# Patient Record
Sex: FEMALE | Race: WHITE | NOT HISPANIC OR LATINO | Employment: OTHER | URBAN - METROPOLITAN AREA
[De-identification: names, ages, dates, MRNs, and addresses within clinical notes are randomized per-mention and may not be internally consistent; named-entity substitution may affect disease eponyms.]

---

## 2017-01-04 ENCOUNTER — OFFICE VISIT (OUTPATIENT)
Dept: FAMILY MEDICINE CLINIC | Facility: CLINIC | Age: 72
End: 2017-01-04

## 2017-01-04 VITALS
SYSTOLIC BLOOD PRESSURE: 120 MMHG | BODY MASS INDEX: 25.88 KG/M2 | HEART RATE: 93 BPM | TEMPERATURE: 97.9 F | HEIGHT: 66 IN | WEIGHT: 161 LBS | DIASTOLIC BLOOD PRESSURE: 84 MMHG

## 2017-01-04 DIAGNOSIS — I10 ESSENTIAL HYPERTENSION: Primary | ICD-10-CM

## 2017-01-04 DIAGNOSIS — C57.00 FALLOPIAN TUBE CANCER, CARCINOMA, UNSPECIFIED LATERALITY (HCC): ICD-10-CM

## 2017-01-04 PROCEDURE — 99213 OFFICE O/P EST LOW 20 MIN: CPT | Performed by: FAMILY MEDICINE

## 2017-01-04 RX ORDER — DICYCLOMINE HCL 20 MG
20 TABLET ORAL
COMMUNITY
Start: 2016-12-21 | End: 2017-01-20

## 2017-01-04 RX ORDER — HYDROCODONE BITARTRATE AND ACETAMINOPHEN 5; 325 MG/1; MG/1
1-2 TABLET ORAL
COMMUNITY
Start: 2016-12-08 | End: 2017-10-03

## 2017-01-04 RX ORDER — ONDANSETRON 4 MG/1
4 TABLET, FILM COATED ORAL EVERY 8 HOURS
COMMUNITY
Start: 2016-12-08 | End: 2017-07-21

## 2017-01-04 RX ORDER — DOCOSANOL 100 MG/G
1 CREAM TOPICAL
COMMUNITY
Start: 2016-12-21 | End: 2017-07-21

## 2017-01-04 RX ORDER — SUCRALFATE 1 G/1
1 TABLET ORAL
COMMUNITY
Start: 2016-12-21 | End: 2017-07-21

## 2017-01-04 RX ORDER — OMEPRAZOLE 40 MG/1
40 CAPSULE, DELAYED RELEASE ORAL
COMMUNITY
Start: 2016-12-23 | End: 2017-07-21

## 2017-01-04 RX ORDER — OLANZAPINE 2.5 MG/1
5 TABLET ORAL
COMMUNITY
Start: 2016-12-08 | End: 2017-07-21

## 2017-01-04 NOTE — MR AVS SNAPSHOT
Lorraine GARY Jarocho   1/4/2017 10:50 AM   Office Visit    Dept Phone:  [phone]   Encounter #:  [id number]    Provider:  Pedro Pablo Aldridge Jr., MD   Department:  [facility]                Your Full Care Plan              Your Updated Medication List          This list is accurate as of: 1/4/17 12:17 PM.  Always use your most recent med list.                apixaban 5 MG tablet tablet   Commonly known as:  ELIQUIS       clidinium-chlordiazePOXIDE 5-2.5 MG per capsule   Commonly known as:  LIBRAX   Take 1 capsule by mouth 4 (four) times a day before meals and nightly for indigestion.       * dicyclomine 10 MG capsule   Commonly known as:  BENTYL   Take 1 capsule by mouth 4 (Four) Times a Day Before Meals & at Bedtime.       * dicyclomine 20 MG tablet   Commonly known as:  BENTYL       docosanol 10 % cream cream   Commonly known as:  ABREVA       FLUoxetine 20 MG capsule   Commonly known as:  PROzac   Take 1 capsule by mouth Daily.       HYDROcodone-acetaminophen 5-325 MG per tablet   Commonly known as:  NORCO       lisinopril 10 MG tablet   Commonly known as:  PRINIVIL,ZESTRIL   Take 1 tablet by mouth Daily.       LORazepam 0.5 MG tablet   Commonly known as:  ATIVAN   Take 1 tablet by mouth 2 (Two) Times a Day As Needed for anxiety.       OLANZapine 2.5 MG tablet   Commonly known as:  zyPREXA       omeprazole 40 MG capsule   Commonly known as:  priLOSEC       ondansetron 4 MG tablet   Commonly known as:  ZOFRAN       pantoprazole 40 MG EC tablet   Commonly known as:  PROTONIX   Take 1 tablet by mouth Daily.       sucralfate 1 G tablet   Commonly known as:  CARAFATE       triamterene-hydrochlorothiazide 37.5-25 MG per capsule   Commonly known as:  DYAZIDE   Take 1 capsule by mouth Daily.       * Notice:  This list has 2 medication(s) that are the same as other medications prescribed for you. Read the directions carefully, and ask your doctor or other  care provider to review them with you.            You Were Diagnosed With        Codes Comments    Essential hypertension    -  Primary ICD-10-CM: I10  ICD-9-CM: 401.9     Fallopian tube cancer, carcinoma, unspecified laterality     ICD-10-CM: C57.00  ICD-9-CM: 183.2       Instructions     None    Patient Instructions History      Upcoming Appointments     Visit Type Date Time Department    HOSPITAL FOLLOW UP 2017 10:50 AM [facility]    FOLLOW UP 2017  1:30 PM [facility]      [employer] Signup     [facility] [employer] allows you to send messages to your doctor, view your test results, renew your prescriptions, schedule appointments, and more. To sign up, go to HeTexted and click on the Sign Up Now link in the New User? box. Enter your [employer] Activation Code exactly as it appears below along with the last four digits of your Social Security Number and your Date of Birth () to complete the sign-up process. If you do not sign up before the expiration date, you must request a new code.    [employer] Activation Code: [email or web address]  Expires: 2017 12:17 PM    If you have questions, you can email [email or web address] or call [phone] to talk to our [employer] staff. Remember, [employer] is NOT to be used for urgent needs. For medical emergencies, dial 911.               Other Info from Your Visit           Your Appointments     2017  1:30 PM EST   Follow Up with Pedro Pablo Aldridge Jr., MD   [facility] (--)    [address] [place] [id number] [phone]           Arrive 15 minutes prior to appointment.              Allergies     Penicillins Intolerance Hives      Reason for Visit     Surgery follow up to remove fallopian tube cancer    Hypertension follow up, blood pressure had be running low      Vital Signs     Blood Pressure Pulse Temperature Height Weight Body Mass Index    120/84 (BP Location: Left  "arm, Patient Position: Sitting, Cuff Size: Adult) 93 97.9 °F (36.6 °C) (Oral) 66\" (167.6 cm) 161 lb (73 kg) 25.99 kg/m2    Smoking Status                   Former Smoker           Problems and Diagnoses Noted     High blood pressure    -  Primary    Cancer of fallopian tube            "

## 2017-01-04 NOTE — PROGRESS NOTES
Chief Complaint   Patient presents with   • Surgery follow up     to remove fallopian tube cancer   • Hypertension     follow up, blood pressure had be running low       Subjective.../HPI  Patient present today with htn. Has adenocarcinoma of the fallopian tubes.    I have reviewed the patient's medical history in detail and updated the computerized patient record.    Family History   Problem Relation Age of Onset   • Colon cancer Sister        Social History     Social History   • Marital status:      Spouse name: N/A   • Number of children: N/A   • Years of education: N/A     Occupational History   • Not on file.     Social History Main Topics   • Smoking status: Former Smoker   • Smokeless tobacco: Never Used   • Alcohol use No   • Drug use: No   • Sexual activity: Not on file     Other Topics Concern   • Not on file     Social History Narrative       Review of Systems:   Review of Systems   Constitutional: Negative for chills, fatigue, fever and unexpected weight change.   HENT: Negative for ear pain, hearing loss, sinus pressure, sore throat and tinnitus.    Eyes: Negative for pain, discharge and redness.   Respiratory: Negative for cough, shortness of breath and wheezing.    Cardiovascular: Negative for chest pain, palpitations and leg swelling.   Gastrointestinal: Negative for abdominal pain, constipation, diarrhea and nausea.   Endocrine: Negative for cold intolerance and heat intolerance.   Genitourinary: Negative for difficulty urinating, flank pain and urgency.   Musculoskeletal: Negative for back pain, joint swelling and myalgias.   Skin: Negative for rash and wound.   Allergic/Immunologic: Negative for environmental allergies and food allergies.   Neurological: Negative for dizziness, seizures, numbness and headaches.   Hematological: Negative for adenopathy. Does not bruise/bleed easily.   Psychiatric/Behavioral: Negative for decreased concentration, dysphoric mood and sleep disturbance. The  "patient is not nervous/anxious.    All other systems reviewed and are negative.      Objective:  Vital Signs     Vitals:    01/04/17 1052 01/04/17 1145   BP: 131/86 120/84   BP Location: Right arm Left arm   Patient Position: Sitting Sitting   Cuff Size:  Adult   Pulse: 93    Temp: 97.9 °F (36.6 °C)    TempSrc: Oral    Weight: 161 lb (73 kg)    Height: 66\" (167.6 cm)      Physical Exam   Constitutional: She is oriented to person, place, and time. She appears well-developed and well-nourished.   HENT:   Head: Normocephalic.   Eyes: Pupils are equal, round, and reactive to light.   Neck: Normal range of motion.   Cardiovascular: Normal rate and regular rhythm.    Pulmonary/Chest: Effort normal.   Abdominal: Soft. Bowel sounds are normal. She exhibits no distension and no mass. There is no tenderness. There is no rebound and no guarding.   Port on RLQ and slight red   Musculoskeletal: Normal range of motion.   Neurological: She is alert and oriented to person, place, and time.   Skin: Skin is warm and dry.        Results Review:      REVIEWED AND DISCUSSED CLINICAL RESULTS WITH PATIENT                          Current Outpatient Prescriptions:   •  apixaban (ELIQUIS) 5 MG tablet tablet, Take 5 mg by mouth., Disp: , Rfl:   •  clidinium-chlordiazepoxide (LIBRAX) 5-2.5 MG per capsule, Take 1 capsule by mouth 4 (four) times a day before meals and nightly for indigestion., Disp: 120 capsule, Rfl: 2  •  dicyclomine (BENTYL) 10 MG capsule, Take 1 capsule by mouth 4 (Four) Times a Day Before Meals & at Bedtime., Disp: 60 capsule, Rfl: 5  •  dicyclomine (BENTYL) 20 MG tablet, Take 20 mg by mouth., Disp: , Rfl:   •  docosanol (ABREVA) 10 % cream cream, Apply 1 application topically., Disp: , Rfl:   •  FLUoxetine (PROzac) 20 MG capsule, Take 1 capsule by mouth Daily., Disp: 30 capsule, Rfl: 2  •  HYDROcodone-acetaminophen (NORCO) 5-325 MG per tablet, Take 1-2 tablets by mouth., Disp: , Rfl:   •  lisinopril (PRINIVIL,ZESTRIL) 10 " MG tablet, Take 1 tablet by mouth Daily., Disp: 30 tablet, Rfl: 2  •  LORazepam (ATIVAN) 0.5 MG tablet, Take 1 tablet by mouth 2 (Two) Times a Day As Needed for anxiety., Disp: 30 tablet, Rfl: 2  •  OLANZapine (zyPREXA) 2.5 MG tablet, Take 2.5 mg by mouth., Disp: , Rfl:   •  omeprazole (priLOSEC) 40 MG capsule, Take 40 mg by mouth., Disp: , Rfl:   •  ondansetron (ZOFRAN) 4 MG tablet, Take 4 mg by mouth Every 8 (Eight) Hours., Disp: , Rfl:   •  pantoprazole (PROTONIX) 40 MG EC tablet, Take 1 tablet by mouth Daily., Disp: 30 tablet, Rfl: 2  •  sucralfate (CARAFATE) 1 G tablet, Take 1 g by mouth., Disp: , Rfl:   •  triamterene-hydrochlorothiazide (DYAZIDE) 37.5-25 MG per capsule, Take 1 capsule by mouth Daily., Disp: 30 capsule, Rfl: 2    Procedures    Assessment/Plan     Diagnoses and all orders for this visit:    Essential hypertension    Fallopian tube cancer, carcinoma, unspecified laterality    Other orders  -     omeprazole (priLOSEC) 40 MG capsule; Take 40 mg by mouth.  -     sucralfate (CARAFATE) 1 G tablet; Take 1 g by mouth.  -     apixaban (ELIQUIS) 5 MG tablet tablet; Take 5 mg by mouth.  -     ondansetron (ZOFRAN) 4 MG tablet; Take 4 mg by mouth Every 8 (Eight) Hours.  -     OLANZapine (zyPREXA) 2.5 MG tablet; Take 2.5 mg by mouth.  -     dicyclomine (BENTYL) 20 MG tablet; Take 20 mg by mouth.  -     HYDROcodone-acetaminophen (NORCO) 5-325 MG per tablet; Take 1-2 tablets by mouth.  -     docosanol (ABREVA) 10 % cream cream; Apply 1 application topically.           Pedro Pablo Aldridge Jr., MD  01/04/17  12:09 PM

## 2017-01-23 ENCOUNTER — OFFICE VISIT (OUTPATIENT)
Dept: FAMILY MEDICINE CLINIC | Facility: CLINIC | Age: 72
End: 2017-01-23

## 2017-01-23 VITALS
HEART RATE: 100 BPM | HEIGHT: 66 IN | OXYGEN SATURATION: 100 % | DIASTOLIC BLOOD PRESSURE: 70 MMHG | WEIGHT: 154.8 LBS | TEMPERATURE: 99.1 F | BODY MASS INDEX: 24.88 KG/M2 | SYSTOLIC BLOOD PRESSURE: 122 MMHG

## 2017-01-23 DIAGNOSIS — K59.03 DRUG-INDUCED CONSTIPATION: Primary | ICD-10-CM

## 2017-01-23 PROCEDURE — 99213 OFFICE O/P EST LOW 20 MIN: CPT | Performed by: FAMILY MEDICINE

## 2017-01-23 RX ORDER — BISACODYL 10 MG
10 SUPPOSITORY, RECTAL RECTAL DAILY
Qty: 10 SUPPOSITORY | Refills: 5 | Status: SHIPPED | OUTPATIENT
Start: 2017-01-23 | End: 2017-07-21

## 2017-01-23 RX ORDER — POLYETHYLENE GLYCOL 3350 17 G/17G
17 POWDER, FOR SOLUTION ORAL DAILY
Qty: 30 EACH | Refills: 5 | Status: SHIPPED | OUTPATIENT
Start: 2017-01-23

## 2017-01-23 RX ORDER — DOCUSATE SODIUM 100 MG/1
100 CAPSULE, LIQUID FILLED ORAL 2 TIMES DAILY
Qty: 60 CAPSULE | Refills: 5 | Status: SHIPPED | OUTPATIENT
Start: 2017-01-23 | End: 2017-10-03 | Stop reason: SDDI

## 2017-01-23 NOTE — MR AVS SNAPSHOT
Lorraine GARY Jarocho   1/23/2017 1:30 PM   Office Visit    Dept Phone:  [phone]   Encounter #:  [id number]    Provider:  Pedro Pablo Aldridge Jr., MD   Department:  [facility] AND INTERNAL MEDICINE                Your Full Care Plan              Today's Medication Changes          These changes are accurate as of: 1/23/17  2:56 PM.  If you have any questions, ask your nurse or doctor.               New Medication(s)Ordered:     bisacodyl 10 MG suppository   Commonly known as:  DULCOLAX   Insert 1 suppository into the rectum Daily.       docusate sodium 100 MG capsule   Commonly known as:  COLACE   Take 1 capsule by mouth 2 (Two) Times a Day.       Linaclotide 145 MCG capsule   Take 145 mcg by mouth Daily As Needed (constipation).       polyethylene glycol packet   Commonly known as:  MIRALAX   Take 17 g by mouth Daily.         Stop taking medication(s)listed here:     clidinium-chlordiazePOXIDE 5-2.5 MG per capsule   Commonly known as:  LIBRAX           dicyclomine 10 MG capsule   Commonly known as:  BENTYL           FLUoxetine 20 MG capsule   Commonly known as:  PROzac           lisinopril 10 MG tablet   Commonly known as:  PRINIVIL,ZESTRIL           pantoprazole 40 MG EC tablet   Commonly known as:  PROTONIX           triamterene-hydrochlorothiazide 37.5-25 MG per capsule   Commonly known as:  DYAZIDE                Where to Get Your Medications      You can get these medications from any pharmacy     Bring a paper prescription for each of these medications     bisacodyl 10 MG suppository    docusate sodium 100 MG capsule    polyethylene glycol packet         Information about where to get these medications is not yet available     ! Ask your nurse or doctor about these medications     Linaclotide 145 MCG capsule                  Your Updated Medication List          This list is accurate as of: 1/23/17  2:56 PM.  Always use your most recent med list.                apixaban 5 MG tablet tablet   Commonly known as:  ELIQUIS       bisacodyl 10 MG suppository   Commonly known as:  DULCOLAX   Insert 1 suppository into the rectum Daily.       docosanol 10 % cream cream   Commonly known as:  ABREVA       docusate sodium 100 MG capsule   Commonly known as:  COLACE   Take 1 capsule by mouth 2 (Two) Times a Day.       HYDROcodone-acetaminophen 5-325 MG per tablet   Commonly known as:  NORCO       Linaclotide 145 MCG capsule   Take 145 mcg by mouth Daily As Needed (constipation).       LORazepam 0.5 MG tablet   Commonly known as:  ATIVAN   Take 1 tablet by mouth 2 (Two) Times a Day As Needed for anxiety.       OLANZapine 2.5 MG tablet   Commonly known as:  zyPREXA       omeprazole 40 MG capsule   Commonly known as:  priLOSEC       ondansetron 4 MG tablet   Commonly known as:  ZOFRAN       polyethylene glycol packet   Commonly known as:  MIRALAX   Take 17 g by mouth Daily.       sucralfate 1 G tablet   Commonly known as:  CARAFATE               You Were Diagnosed With        Codes Comments    Drug-induced constipation    -  Primary ICD-10-CM: K59.03  ICD-9-CM: 564.09, E980.5       Instructions     None    Patient Instructions History      Upcoming Appointments     Visit Type Date Time Department    ACUTE           2017  1:30 PM [facility] [place] [facility]    [facility]      [employer] Signup     [facility] [employer] allows you to send messages to your doctor, view your test results, renew your prescriptions, schedule appointments, and more. To sign up, go to Cyan and click on the Sign Up Now link in the New User? box. Enter your [employer] Activation Code exactly as it appears below along with the last four digits of your Social Security Number and your Date of Birth () to complete the sign-up process. If you do not sign up before the expiration date, you must request a new code.    [employer] Activation Code: [email or web address]  Expires:  "2/6/2017  2:56 PM    If you have questions, you can email [email or web address] or call [phone] to talk to our MyChart staff. Remember, [employer] is NOT to be used for urgent needs. For medical emergencies, dial 911.               Other Info from Your Visit           Your Appointments     Feb 16, 2017  1:30 PM EST   Follow Up with Pedro Pablo Aldridge Jr., MD   [facility] INTERNAL MEDICINE (--)    [address] [place] [address] [phone]           Arrive 15 minutes prior to appointment.              Allergies     Penicillins Intolerance Hives      Reason for Visit     Nausea c/o nausea and constipation      Vital Signs     Blood Pressure Pulse Temperature Height    122/70 (BP Location: Right arm, Patient Position: Sitting, Cuff Size: Adult) 100 99.1 °F (37.3 °C) (Oral) 66\" (167.6 cm)    Weight Oxygen Saturation Body Mass Index Smoking Status    154 lb 12.8 oz (70.2 kg) 100% 24.99 kg/m2 Former Smoker      Problems and Diagnoses Noted     Drug-induced constipation    -  Primary        "

## 2017-01-23 NOTE — PROGRESS NOTES
"  Chief Complaint   Patient presents with   • Nausea     c/o nausea and constipation       Subjective.../HPI  Patient present today with nausea. Has not had pain meds in one week. She is moving to [place] in one week to live with daughter until may 2017 when finish chemo therapy. Will be seeing dr wong for chemo in [place]. Was in hospital for dehydration.     I have reviewed the patient's medical history in detail and updated the computerized patient record.    Family History   Problem Relation Age of Onset   • Colon cancer Sister        Social History     Social History   • Marital status:      Spouse name: N/A   • Number of children: N/A   • Years of education: N/A     Occupational History   • Not on file.     Social History Main Topics   • Smoking status: Former Smoker   • Smokeless tobacco: Never Used   • Alcohol use No   • Drug use: No   • Sexual activity: Not on file     Other Topics Concern   • Not on file     Social History Narrative       Review of Systems:   Review of Systems   Constitutional: Negative.    HENT: Negative.    Eyes: Negative.    Respiratory: Negative.    Cardiovascular: Negative.    Gastrointestinal: Positive for constipation and nausea.   Endocrine: Negative.    Genitourinary: Negative.    Musculoskeletal: Negative.    Skin: Negative.    Allergic/Immunologic: Negative.    Neurological: Negative.    Hematological: Negative.    Psychiatric/Behavioral: Negative.        Objective:  Vital Signs     Vitals:    01/23/17 1357   BP: 122/70   BP Location: Right arm   Patient Position: Sitting   Cuff Size: Adult   Pulse: 100   Temp: 99.1 °F (37.3 °C)   TempSrc: Oral   SpO2: 100%   Weight: 154 lb 12.8 oz (70.2 kg)   Height: 66\" (167.6 cm)     Physical Exam   Constitutional: She is oriented to person, place, and time. She appears well-developed and well-nourished.   HENT:   Head: Normocephalic.   Eyes: Pupils are equal, round, and reactive to light.   Neck: Normal range of motion. "   Cardiovascular: Normal rate and regular rhythm.    Pulmonary/Chest: Effort normal.   Abdominal: Soft. Bowel sounds are normal. She exhibits no mass. There is tenderness (tender all over). There is no rebound and no guarding.   Musculoskeletal: Normal range of motion.   Neurological: She is alert and oriented to person, place, and time.   Skin: Skin is warm and dry.        Results Review:      REVIEWED AND DISCUSSED CLINICAL RESULTS WITH PATIENT                          Current Outpatient Prescriptions:   •  apixaban (ELIQUIS) 5 MG tablet tablet, Take 5 mg by mouth., Disp: , Rfl:   •  HYDROcodone-acetaminophen (NORCO) 5-325 MG per tablet, Take 1-2 tablets by mouth., Disp: , Rfl:   •  OLANZapine (zyPREXA) 2.5 MG tablet, Take 5 mg by mouth., Disp: , Rfl:   •  omeprazole (priLOSEC) 40 MG capsule, Take 40 mg by mouth., Disp: , Rfl:   •  ondansetron (ZOFRAN) 4 MG tablet, Take 4 mg by mouth Every 8 (Eight) Hours., Disp: , Rfl:   •  bisacodyl (DULCOLAX) 10 MG suppository, Insert 1 suppository into the rectum Daily., Disp: 10 suppository, Rfl: 5  •  docosanol (ABREVA) 10 % cream cream, Apply 1 application topically., Disp: , Rfl:   •  docusate sodium (COLACE) 100 MG capsule, Take 1 capsule by mouth 2 (Two) Times a Day., Disp: 60 capsule, Rfl: 5  •  Linaclotide 145 MCG capsule, Take 145 mcg by mouth Daily As Needed (constipation)., Disp: 30 capsule, Rfl: 0  •  LORazepam (ATIVAN) 0.5 MG tablet, Take 1 tablet by mouth 2 (Two) Times a Day As Needed for anxiety., Disp: 30 tablet, Rfl: 2  •  polyethylene glycol (MIRALAX) packet, Take 17 g by mouth Daily., Disp: 30 each, Rfl: 5  •  sucralfate (CARAFATE) 1 G tablet, Take 1 g by mouth., Disp: , Rfl:     Procedures    Assessment/Plan     Diagnoses and all orders for this visit:    Drug-induced constipation    Other orders  -     polyethylene glycol (MIRALAX) packet; Take 17 g by mouth Daily.  -     docusate sodium (COLACE) 100 MG capsule; Take 1 capsule by mouth 2 (Two) Times a  Day.  -     bisacodyl (DULCOLAX) 10 MG suppository; Insert 1 suppository into the rectum Daily.  -     Linaclotide 145 MCG capsule; Take 145 mcg by mouth Daily As Needed (constipation).         Pedro Pablo Aldridge Jr., MD  01/23/17  2:50 PM

## 2017-05-05 ENCOUNTER — TELEPHONE (OUTPATIENT)
Dept: FAMILY MEDICINE CLINIC | Facility: CLINIC | Age: 72
End: 2017-05-05

## 2017-06-06 ENCOUNTER — OFFICE VISIT (OUTPATIENT)
Dept: FAMILY MEDICINE CLINIC | Facility: CLINIC | Age: 72
End: 2017-06-06

## 2017-06-06 VITALS
OXYGEN SATURATION: 97 % | DIASTOLIC BLOOD PRESSURE: 92 MMHG | WEIGHT: 145 LBS | SYSTOLIC BLOOD PRESSURE: 138 MMHG | TEMPERATURE: 97.8 F | HEIGHT: 65 IN | BODY MASS INDEX: 24.16 KG/M2 | HEART RATE: 85 BPM

## 2017-06-06 DIAGNOSIS — M89.8X9 BONE PAIN: ICD-10-CM

## 2017-06-06 DIAGNOSIS — C57.00 FALLOPIAN TUBE CANCER, CARCINOMA, UNSPECIFIED LATERALITY (HCC): Primary | ICD-10-CM

## 2017-06-06 PROCEDURE — G0439 PPPS, SUBSEQ VISIT: HCPCS | Performed by: FAMILY MEDICINE

## 2017-06-06 PROCEDURE — 99213 OFFICE O/P EST LOW 20 MIN: CPT | Performed by: FAMILY MEDICINE

## 2017-06-06 NOTE — PROGRESS NOTES
QUICK REFERENCE INFORMATION:  The ABCs of the Annual Wellness Visit    Subsequent Medicare Wellness Visit    HEALTH RISK ASSESSMENT    1945    Recent Hospitalizations:  Recently treated at the following:  [facility].        Current Medical Providers:  Patient Care Team:  Pedro Pablo Aldridge Jr., MD as PCP - General  Pedro Pablo Aldridge Jr., MD as PCP - Family Medicine  Pedro Pablo Aldridge Jr., MD as PCP - Claims Attributed        Smoking Status:  History   Smoking Status   • Former Smoker   Smokeless Tobacco   • Never Used       Alcohol Consumption:  History   Alcohol Use No       Depression Screen:   No flowsheet data found.    Health Habits and Functional and Cognitive Screening:  Functional & Cognitive Status 6/6/2017   Do you have difficulty preparing food and eating? No   Do you have difficulty bathing yourself? No   Do you have difficulty getting dressed? No   Do you have difficulty using the toilet? No   Do you have difficulty moving around from place to place? No   In the past year have you fallen or experienced a near fall? No   Do you need help using the phone?  No   Are you deaf or do you have serious difficulty hearing?  No   Do you need help with transportation? No   Do you need help shopping? No   Do you need help preparing meals?  No   Do you need help with housework?  No   Do you need help with laundry? No   Do you need help taking your medications? No   Do you need help managing money? No       Health Habits  Current Diet: Well Balanced Diet  Dental Exam: Up to date  Eye Exam: Up to date  Exercise (times per week): 0 times per week  Current Exercise Activities Include: None      Does the patient have evidence of cognitive impairment? No    Aspirin use counseling: Does not need ASA (and currently is not on it)      Recent Lab Results:  CMP:  Lab Results   Component Value Date    GLU 96 07/27/2016    BUN 11 07/27/2016    CREATININE 1.00 11/17/2016    EGFRIFNONA 73 07/27/2016     EGFRIFAFRI 84 07/27/2016    BCR 14 07/27/2016     07/27/2016    K 4.5 07/27/2016    CO2 26 07/27/2016    CALCIUM 10.3 07/27/2016    PROTENTOTREF 6.7 07/27/2016    ALBUMIN 4.4 07/27/2016    LABGLOBREF 2.3 07/27/2016    LABIL2 1.9 07/27/2016    BILITOT 0.4 07/27/2016    ALKPHOS 64 07/27/2016    AST 22 07/27/2016    ALT 19 07/27/2016     Lipid Panel:     HbA1c:  Lab Results   Component Value Date    HGBA1C 6.0 (H) 07/27/2016       Visual Acuity:  No exam data present    Age-appropriate Screening Schedule:  Refer to the list below for future screening recommendations based on patient's age, sex and/or medical conditions. Orders for these recommended tests are listed in the plan section. The patient has been provided with a written plan.    Health Maintenance   Topic Date Due   • PNEUMOCOCCAL VACCINES (65+ LOW/MEDIUM RISK) (2 of 2 - PPSV23) 11/01/2013   • LIPID PANEL  07/27/2016   • INFLUENZA VACCINE  08/01/2017   • MAMMOGRAM  04/01/2018   • COLONOSCOPY  06/20/2024   • TDAP/TD VACCINES (2 - Td) 12/19/2024   • ZOSTER VACCINE  Completed        Subjective   History of Present Illness    Lorraine Avendaño is a 71 y.o. female who presents for an Subsequent Wellness Visit.    The following portions of the patient's history were reviewed and updated as appropriate: allergies, current medications, past family history, past medical history, past social history, past surgical history and problem list.    Outpatient Medications Prior to Visit   Medication Sig Dispense Refill   • bisacodyl (DULCOLAX) 10 MG suppository Insert 1 suppository into the rectum Daily. 10 suppository 5   • docosanol (ABREVA) 10 % cream cream Apply 1 application topically.     • docusate sodium (COLACE) 100 MG capsule Take 1 capsule by mouth 2 (Two) Times a Day. 60 capsule 5   • HYDROcodone-acetaminophen (NORCO) 5-325 MG per tablet Take 1-2 tablets by mouth.     • Linaclotide 145 MCG capsule Take 145 mcg by mouth Daily As Needed (constipation). 30 capsule 0  "  • LORazepam (ATIVAN) 0.5 MG tablet Take 1 tablet by mouth 2 (Two) Times a Day As Needed for anxiety. 30 tablet 2   • OLANZapine (zyPREXA) 2.5 MG tablet Take 5 mg by mouth.     • omeprazole (priLOSEC) 40 MG capsule Take 40 mg by mouth.     • ondansetron (ZOFRAN) 4 MG tablet Take 4 mg by mouth Every 8 (Eight) Hours.     • polyethylene glycol (MIRALAX) packet Take 17 g by mouth Daily. 30 each 5   • sucralfate (CARAFATE) 1 G tablet Take 1 g by mouth.       No facility-administered medications prior to visit.        There is no problem list on file for this patient.      Advance Care Planning:  has an advance directive - a copy has been provided and is in file    Identification of Risk Factors:  Risk factors include:     Review of Systems    Compared to one year ago, the patient feels her physical health is better  Compared to one year ago, the patient feels her mental health is worse  Anxiety over condition.    Objective     Physical Exam    Vitals:    06/06/17 1539   BP: 138/92   BP Location: Left arm   Patient Position: Sitting   Pulse: 85   Temp: 97.8 °F (36.6 °C)   TempSrc: Oral   SpO2: 97%   Weight: 145 lb (65.8 kg)   Height: 65\" (165.1 cm)   PainSc: 6  Comment: bilateral hips and legs   PainLoc: Hip       Body mass index is 24.13 kg/(m^2).  Discussed the patient's BMI with her. The BMI is in the acceptable range.    Assessment/Plan   Patient Self-Management and Personalized Health Advice  · The patient has been provided with information about:  and preventive services including: n/a    Visit Diagnoses:  No diagnosis found.    No orders of the defined types were placed in this encounter.      Outpatient Encounter Prescriptions as of 6/6/2017   Medication Sig Dispense Refill   • bisacodyl (DULCOLAX) 10 MG suppository Insert 1 suppository into the rectum Daily. 10 suppository 5   • docosanol (ABREVA) 10 % cream cream Apply 1 application topically.     • docusate sodium (COLACE) 100 MG capsule Take 1 capsule by " mouth 2 (Two) Times a Day. 60 capsule 5   • HYDROcodone-acetaminophen (NORCO) 5-325 MG per tablet Take 1-2 tablets by mouth.     • Linaclotide 145 MCG capsule Take 145 mcg by mouth Daily As Needed (constipation). 30 capsule 0   • LORazepam (ATIVAN) 0.5 MG tablet Take 1 tablet by mouth 2 (Two) Times a Day As Needed for anxiety. 30 tablet 2   • OLANZapine (zyPREXA) 2.5 MG tablet Take 5 mg by mouth.     • omeprazole (priLOSEC) 40 MG capsule Take 40 mg by mouth.     • ondansetron (ZOFRAN) 4 MG tablet Take 4 mg by mouth Every 8 (Eight) Hours.     • polyethylene glycol (MIRALAX) packet Take 17 g by mouth Daily. 30 each 5   • sucralfate (CARAFATE) 1 G tablet Take 1 g by mouth.       No facility-administered encounter medications on file as of 6/6/2017.        Reviewed use of high risk medication in the elderly: not applicable  Reviewed for potential of harmful drug interactions in the elderly: not applicable    Follow Up:  No Follow-up on file.     An After Visit Summary and PPPS with all of these plans were given to the patient.

## 2017-06-06 NOTE — PROGRESS NOTES
"  Chief Complaint   Patient presents with   • Pain     pt since chemotheraphy is been having lots of muscle pain ,hips pain ,on arms and legs and get worse when walks and stands   • Annual Exam     subsequent medicare exam       Subjective.../HPI  Patient present today with bone pain. To have bone density and thyroid us on 6/16/17. Has thyroid cyst x 2.     I have reviewed the patient's medical history in detail and updated the computerized patient record.    Family History   Problem Relation Age of Onset   • Colon cancer Sister        Social History     Social History   • Marital status:      Spouse name: N/A   • Number of children: N/A   • Years of education: N/A     Occupational History   • Not on file.     Social History Main Topics   • Smoking status: Former Smoker   • Smokeless tobacco: Never Used   • Alcohol use No   • Drug use: No   • Sexual activity: Not on file     Other Topics Concern   • Not on file     Social History Narrative       Review of Systems:   Review of Systems   Constitutional: Negative.    HENT: Negative.    Eyes: Negative.    Respiratory: Negative.    Cardiovascular: Positive for leg swelling.   Gastrointestinal: Negative.    Endocrine: Negative.    Genitourinary: Positive for frequency.   Musculoskeletal: Positive for back pain, gait problem and myalgias.        Bilateral hip pain ,legs  And arms   Allergic/Immunologic: Negative.    Psychiatric/Behavioral: Positive for sleep disturbance.       Objective:  Vital Signs     Vitals:    06/06/17 1539   BP: 138/92   BP Location: Left arm   Patient Position: Sitting   Pulse: 85   Temp: 97.8 °F (36.6 °C)   TempSrc: Oral   SpO2: 97%   Weight: 145 lb (65.8 kg)   Height: 65\" (165.1 cm)     Physical Exam   Constitutional: She is oriented to person, place, and time. She appears well-developed and well-nourished.   HENT:   Head: Normocephalic.   Eyes: Pupils are equal, round, and reactive to light.   Neck: Normal range of motion. "   Cardiovascular: Normal rate, regular rhythm and normal heart sounds.    Pulmonary/Chest: Effort normal and breath sounds normal.   Abdominal: Soft. Bowel sounds are normal.   Musculoskeletal: Normal range of motion.   Neurological: She is alert and oriented to person, place, and time.   Skin: Skin is warm and dry.        Results Review:      REVIEWED AND DISCUSSED CLINICAL RESULTS WITH PATIENT                          Current Outpatient Prescriptions:   •  bisacodyl (DULCOLAX) 10 MG suppository, Insert 1 suppository into the rectum Daily., Disp: 10 suppository, Rfl: 5  •  docosanol (ABREVA) 10 % cream cream, Apply 1 application topically., Disp: , Rfl:   •  docusate sodium (COLACE) 100 MG capsule, Take 1 capsule by mouth 2 (Two) Times a Day., Disp: 60 capsule, Rfl: 5  •  HYDROcodone-acetaminophen (NORCO) 5-325 MG per tablet, Take 1-2 tablets by mouth., Disp: , Rfl:   •  Linaclotide 145 MCG capsule, Take 145 mcg by mouth Daily As Needed (constipation)., Disp: 30 capsule, Rfl: 0  •  LORazepam (ATIVAN) 0.5 MG tablet, Take 1 tablet by mouth 2 (Two) Times a Day As Needed for anxiety., Disp: 30 tablet, Rfl: 2  •  OLANZapine (zyPREXA) 2.5 MG tablet, Take 5 mg by mouth., Disp: , Rfl:   •  omeprazole (priLOSEC) 40 MG capsule, Take 40 mg by mouth., Disp: , Rfl:   •  ondansetron (ZOFRAN) 4 MG tablet, Take 4 mg by mouth Every 8 (Eight) Hours., Disp: , Rfl:   •  polyethylene glycol (MIRALAX) packet, Take 17 g by mouth Daily., Disp: 30 each, Rfl: 5  •  sucralfate (CARAFATE) 1 G tablet, Take 1 g by mouth., Disp: , Rfl:     Procedures    Assessment/Plan     Diagnoses and all orders for this visit:    Fallopian tube cancer, carcinoma, unspecified laterality  -     NM Bone Scan Whole Body    Bone pain  -     NM Bone Scan Whole Body         Pedro Pablo Aldridge Jr., MD  06/06/17  4:57 PM

## 2017-06-13 ENCOUNTER — HOSPITAL ENCOUNTER (OUTPATIENT)
Dept: NUCLEAR MEDICINE | Facility: HOSPITAL | Age: 72
Discharge: HOME | End: 2017-06-13
Attending: FAMILY MEDICINE

## 2017-06-13 PROCEDURE — 78306 BONE IMAGING WHOLE BODY: CPT

## 2017-06-13 PROCEDURE — A9503 TC99M MEDRONATE: HCPCS | Performed by: FAMILY MEDICINE

## 2017-06-13 PROCEDURE — 0 TECHNETIUM MEDRONATE KIT: Performed by: FAMILY MEDICINE

## 2017-06-13 RX ORDER — TC 99M MEDRONATE 20 MG/10ML
22.6 INJECTION, POWDER, LYOPHILIZED, FOR SOLUTION INTRAVENOUS
Status: COMPLETED | OUTPATIENT
Start: 2017-06-13 | End: 2017-06-13

## 2017-06-13 RX ADMIN — Medication 22.6 MILLICURIE: at 09:45

## 2017-06-16 ENCOUNTER — TELEPHONE (OUTPATIENT)
Dept: FAMILY MEDICINE CLINIC | Facility: CLINIC | Age: 72
End: 2017-06-16

## 2017-06-16 NOTE — TELEPHONE ENCOUNTER
----- Message from Nenita Sy sent at 6/16/2017  9:36 AM EDT -----  Bone scan results   [phone]-  Patient anxious

## 2017-06-19 ENCOUNTER — TELEPHONE (OUTPATIENT)
Dept: FAMILY MEDICINE CLINIC | Facility: CLINIC | Age: 72
End: 2017-06-19

## 2017-06-19 DIAGNOSIS — M25.511 ACUTE PAIN OF RIGHT SHOULDER: ICD-10-CM

## 2017-06-19 DIAGNOSIS — M25.552 PAIN OF LEFT HIP JOINT: Primary | ICD-10-CM

## 2017-06-19 NOTE — TELEPHONE ENCOUNTER
----- Message from Nenita Sy sent at 6/19/2017 10:42 AM EDT -----  Results bone scan     -  [phone]  lov 6/6/17

## 2017-06-19 NOTE — TELEPHONE ENCOUNTER
Pt notified of results after Dr DEL CASTILLO reviewed.  Pt still c/o of pain in both knees, lt hip, rt shoulder  And legs burn.

## 2017-06-20 NOTE — PROGRESS NOTES
A she states she's developed left hip pain and right shoulder pain since having her chemotherapy down [place].  She also complains of knee pain but this is chronic even before [place].  She has pelvic cancer.

## 2017-07-03 ENCOUNTER — HOSPITAL ENCOUNTER (OUTPATIENT)
Dept: MRI IMAGING | Facility: HOSPITAL | Age: 72
Discharge: HOME | End: 2017-07-03
Attending: FAMILY MEDICINE | Admitting: FAMILY MEDICINE

## 2017-07-03 ENCOUNTER — HOSPITAL ENCOUNTER (OUTPATIENT)
Dept: MRI IMAGING | Facility: HOSPITAL | Age: 72
Discharge: HOME | End: 2017-07-03
Attending: FAMILY MEDICINE

## 2017-07-03 PROCEDURE — 73223 MRI JOINT UPR EXTR W/O&W/DYE: CPT

## 2017-07-03 PROCEDURE — 82565 ASSAY OF CREATININE: CPT

## 2017-07-03 PROCEDURE — 0 GADOBENATE DIMEGLUMINE 529 MG/ML SOLUTION: Performed by: FAMILY MEDICINE

## 2017-07-03 PROCEDURE — 73723 MRI JOINT LWR EXTR W/O&W/DYE: CPT

## 2017-07-03 PROCEDURE — A9577 INJ MULTIHANCE: HCPCS | Performed by: FAMILY MEDICINE

## 2017-07-03 RX ADMIN — GADOBENATE DIMEGLUMINE 13 ML: 529 INJECTION, SOLUTION INTRAVENOUS at 17:29

## 2017-07-05 ENCOUNTER — TELEPHONE (OUTPATIENT)
Dept: FAMILY MEDICINE CLINIC | Facility: CLINIC | Age: 72
End: 2017-07-05

## 2017-07-06 DIAGNOSIS — IMO0001 SUPRASPINATUS TENDON TEAR, RIGHT, INITIAL ENCOUNTER: Primary | ICD-10-CM

## 2017-07-06 LAB — CREAT BLDA-MCNC: 0.8 MG/DL (ref 0.6–1.3)

## 2017-07-06 NOTE — PROGRESS NOTES
I told the patient the results of the MRI of the right shoulder and the left hip.  She has partial tear in the right supraspinatus tendon and the left gluteus medius muscle tendon.  Will refer to Dr. Lucero in orthopedics.  It was no evidence of metastatic disease.

## 2017-07-10 RX ORDER — FLUOXETINE HYDROCHLORIDE 20 MG/1
CAPSULE ORAL
Qty: 90 CAPSULE | Refills: 0 | Status: SHIPPED | OUTPATIENT
Start: 2017-07-10 | End: 2017-12-02 | Stop reason: SDUPTHER

## 2017-07-17 ENCOUNTER — TELEPHONE (OUTPATIENT)
Dept: ORTHOPEDIC SURGERY | Facility: CLINIC | Age: 72
End: 2017-07-17

## 2017-07-17 NOTE — TELEPHONE ENCOUNTER
OPNC / prev RAMÓN pat referred to [facility] by Bill Aldridge for partial tear in the right supraspinatus tendon and the left gluteus medius muscle tendon. Pain since April 2017 - L Hip MRI & R Shldr MRI done 7/03/17 (EPIC) Will [facility] see NAD 8/09/17 (EP), 8/25 (Teresa) or sooner / next couple weeks? Thanks

## 2017-07-21 ENCOUNTER — OFFICE VISIT (OUTPATIENT)
Dept: ORTHOPEDIC SURGERY | Facility: CLINIC | Age: 72
End: 2017-07-21

## 2017-07-21 VITALS — HEIGHT: 65 IN | WEIGHT: 146 LBS | BODY MASS INDEX: 24.32 KG/M2 | TEMPERATURE: 97.8 F

## 2017-07-21 DIAGNOSIS — IMO0002 BURSITIS/TENDONITIS, SHOULDER: ICD-10-CM

## 2017-07-21 DIAGNOSIS — M70.72 HIP BURSITIS, LEFT: Primary | ICD-10-CM

## 2017-07-21 PROCEDURE — 99204 OFFICE O/P NEW MOD 45 MIN: CPT | Performed by: ORTHOPAEDIC SURGERY

## 2017-07-21 PROCEDURE — 20610 DRAIN/INJ JOINT/BURSA W/O US: CPT | Performed by: ORTHOPAEDIC SURGERY

## 2017-07-21 RX ADMIN — BUPIVACAINE HYDROCHLORIDE 2 ML: 5 INJECTION, SOLUTION PERINEURAL at 14:27

## 2017-07-21 RX ADMIN — LIDOCAINE HYDROCHLORIDE 2 ML: 10 INJECTION, SOLUTION INFILTRATION; PERINEURAL at 14:27

## 2017-07-21 RX ADMIN — METHYLPREDNISOLONE ACETATE 160 MG: 80 INJECTION, SUSPENSION INTRA-ARTICULAR; INTRALESIONAL; INTRAMUSCULAR; SOFT TISSUE at 14:27

## 2017-07-23 RX ORDER — METHYLPREDNISOLONE ACETATE 80 MG/ML
160 INJECTION, SUSPENSION INTRA-ARTICULAR; INTRALESIONAL; INTRAMUSCULAR; SOFT TISSUE
Status: COMPLETED | OUTPATIENT
Start: 2017-07-21 | End: 2017-07-21

## 2017-07-23 RX ORDER — LIDOCAINE HYDROCHLORIDE 10 MG/ML
2 INJECTION, SOLUTION INFILTRATION; PERINEURAL
Status: COMPLETED | OUTPATIENT
Start: 2017-07-21 | End: 2017-07-21

## 2017-07-23 RX ORDER — BUPIVACAINE HYDROCHLORIDE 5 MG/ML
2 INJECTION, SOLUTION PERINEURAL
Status: COMPLETED | OUTPATIENT
Start: 2017-07-21 | End: 2017-07-21

## 2017-07-23 NOTE — PROGRESS NOTES
Patient: Lorraine Avendaño    YOB: 1945    Medical Record Number: [id number]    Chief Complaints:  Left hip and right shoulder pain    History of Present Illness:     71 y.o. female patient who presents for evaluation of her right shoulder and left hip.  She reports that the hip is more bothersome than the shoulder.  She fell on April 17 and aggravated the hip.  It has continued to bother her ever since.  She describes moderate to severe constant aching pain which is worse when lying on her left side.  The pain is also worse with standing, sitting, and prolonged walking.  She has not noticed any alleviating factors.    She describes her shoulder pain as moderate, constant, and aching.  The pain is worse with reaching and lifting.  This pain is primarily along the lateral side of the upper arm.  Again, she has not noticed any alleviating factors.    Allergies:   Allergies   Allergen Reactions   • Penicillins Hives   • Filgrastim      Allergic To the generic form - gave back spasms        Home Medications:      Current Outpatient Prescriptions:   •  apixaban (ELIQUIS) 5 MG tablet tablet, Take 5 mg by mouth Every 12 (Twelve) Hours., Disp: , Rfl:   •  docusate sodium (COLACE) 100 MG capsule, Take 1 capsule by mouth 2 (Two) Times a Day., Disp: 60 capsule, Rfl: 5  •  FLUoxetine (PROzac) 20 MG capsule, Take 1 capsule by mouth  daily, Disp: 90 capsule, Rfl: 0  •  HYDROcodone-acetaminophen (NORCO) 5-325 MG per tablet, Take 1-2 tablets by mouth., Disp: , Rfl:   •  LORazepam (ATIVAN) 0.5 MG tablet, Take 1 tablet by mouth 2 (Two) Times a Day As Needed for anxiety., Disp: 30 tablet, Rfl: 2  •  polyethylene glycol (MIRALAX) packet, Take 17 g by mouth Daily., Disp: 30 each, Rfl: 5  •  Linaclotide 145 MCG capsule, Take 145 mcg by mouth Daily As Needed (constipation)., Disp: 30 capsule, Rfl: 0    Past Medical History:   Diagnosis Date   • Anxiety    • Dizziness    • Encounter for annual health examination     Annual  "Health Assessment: 01/20/14, 10/17/13   • Fatigue    • Feeling faint    • Frequent urination    • GERD (gastroesophageal reflux disease)    • History of herniated intervertebral disc     \"HERNIATED DISC\"   • History of mammogram 07/25/2013   • Hyperlipidemia    • Hypertension    • Urinary incontinence    • Wellness examination     Annual Wellness Visit: 01/07/16, 12/19/14       Past Surgical History:   Procedure Laterality Date   • BREAST BIOPSY     • BREAST LUMPECTOMY     • COLONOSCOPY  06/20/2014    ABNORMAL 10 mm sessile lesion in the cecum, 4 diminutive polyps   • COLONOSCOPY  06/23/2011   • COLONOSCOPY  07/27/2006   • HYSTERECTOMY  12/05/2016    to remove cancer   • PAP SMEAR  10/20/2011       Social History     Occupational History   • Not on file.     Social History Main Topics   • Smoking status: Former Smoker   • Smokeless tobacco: Never Used   • Alcohol use No   • Drug use: No   • Sexual activity: Not on file      Social History     Social History Narrative       Family History   Problem Relation Age of Onset   • Colon cancer Sister        Review of Systems:      Constitutional: Denies fever, shaking or chills   Eyes: Denies change in visual acuity   HEENT: Denies nasal congestion or sore throat   Respiratory: Denies cough or shortness of breath   Cardiovascular: Denies chest pain or edema  Endocrine: Denies tremors, palpitations, intolerance of heat or cold, polyuria, polydipsia.  GI: Denies abdominal pain, nausea, vomiting, bloody stools or diarrhea  : Denies frequency, urgency, incontinence, retention, or nocturia.  Musculoskeletal: Denies numbness tingling or loss of motor function except as above  Integument: Denies rash, lesion or ulceration   Neurologic: Denies headache or focal weakness, deficits  Heme: Denies epistaxis, spontaneous or excessive bleeding, epistaxis, hematuria, melena, fatigue, enlarged or tender lymph nodes.      All other pertinent positives and negatives as noted above in " "HPI.    Physical Exam: 71 y.o. female    Vitals:    07/21/17 1619   Temp: 97.8 °F (36.6 °C)   TempSrc: Temporal Artery    Weight: 146 lb (66.2 kg)   Height: 65\" (165.1 cm)     General:  Patient is awake and alert.  Appears in no acute distress or discomfort.    Psych:  Affect and demeanor are appropriate.    Eyes:  Conjunctiva and sclera appear grossly normal.  Eyes track well and EOM seem to be intact.    Ears:  No gross abnormalities.  Hearing adequate for the exam.    Cardiovascular:  Regular rate and rhythm.    Lungs:  Good chest expansion.  Breathing unlabored.    Lymph:  No palpable masses or adenopathy in the affected extremity    Extremities:  Right shoulder is examined.  Skin is benign.  No gross abnormalities on inspection including any atrophy, swellings, or masses.  No palpable masses or adenopathy.  Focal tenderness noted over biceps groove.  Full shoulder motion.  No evident instability or apprehension.  Positive Neer, Lua.  Negative active compression maneuver.  Negative Speeds and Yergasons maneuver.  Discomfort but no significant weakness with forward elevation in the scapular plane.  Good strength in the deltoid, biceps, triceps, and .  Intact sensation throughout the arm.  Brisk cap refill.    Left hip is also examined.  Skin is benign.  No palpable swellings, masses, or adenopathy.  Moderate tenderness over the abductor insertion and trochanteric bursa.  Hip motion is full.  No instability.  Good strength with hip abduction although it is uncomfortable.  Good strength with knee extension, ankle and toe plantar flexion and dorsiflexion.  Sensation is intact throughout the leg.  Palpable pedal pulses.         Radiology:   MRIs of the right shoulder and left hip are both reviewed along with the associated reports.  The MRI of the right shoulder shows rotator cuff tendinopathy and a small interstitial tear of the anterior supraspinatus.  She has what appears to be a degenerative glenoid " labral tear with questionable extension into the biceps as well as moderate subacromial/subdeltoid bursitis.  MRI of the left hip shows insertional tendinitis at the abductor was a small partial tear of the gluteus medius.  There is also mild increased bursal fluid in the peritrochanteric space.    Assessment/Plan:  1.  Left hip abductor tendonitis and bursitis  2. Right rotator cuff tendonopathy with subacromial bursitis    We discussed options in detail.  She expressed interest in injections for both the hip and shoulder.  I recommended a subacromial injection for the shoulder.  Her exam suggests that her pain is more rotator cuff mediated and labral.  The injection should help to determine if that is the case.  The risks, benefits, and alternatives were discussed.  She consented to proceed with those.  Going forward, I will release her to follow-up as needed.  If her symptoms persist and/or recur, I told her I will be happy to see her back at any point.    Large Joint Arthrocentesis  Date/Time: 7/21/2017 2:27 PM  Consent given by: patient  Site marked: site marked  Timeout: Immediately prior to procedure a time out was called to verify the correct patient, procedure, equipment, support staff and site/side marked as required   Supporting Documentation  Indications: pain and joint swelling   Procedure Details  Location: shoulder - R subacromial bursa  Preparation: Patient was prepped and draped in the usual sterile fashion  Needle size: 25 G  Approach: posterior  Medications administered: 2 mL lidocaine 1 %; 160 mg methylPREDNISolone acetate 80 MG/ML; 2 mL bupivacaine  Patient tolerance: patient tolerated the procedure well with no immediate complications    Large Joint Arthrocentesis  Date/Time: 7/21/2017 2:27 PM  Consent given by: patient  Site marked: site marked  Timeout: Immediately prior to procedure a time out was called to verify the correct patient, procedure, equipment, support staff and site/side marked  as required   Supporting Documentation  Indications: pain   Procedure Details  Location: hip -   Preparation: Patient was prepped and draped in the usual sterile fashion  Needle size: 25 G  Approach: anterolateral  Medications administered: 2 mL bupivacaine; 2 mL lidocaine 1 %; 160 mg methylPREDNISolone acetate 80 MG/ML  Patient tolerance: patient tolerated the procedure well with no immediate complications        Jose Alberto Lucero MD    07/21/2017    CC to Pedro Pablo Aldridge Jr., MD

## 2017-09-13 RX ORDER — LISINOPRIL 10 MG/1
TABLET ORAL
Qty: 90 TABLET | Refills: 1 | Status: SHIPPED | OUTPATIENT
Start: 2017-09-13

## 2017-09-13 RX ORDER — TRIAMTERENE AND HYDROCHLOROTHIAZIDE 37.5; 25 MG/1; MG/1
CAPSULE ORAL
Qty: 90 CAPSULE | Refills: 1 | Status: SHIPPED | OUTPATIENT
Start: 2017-09-13

## 2017-10-03 ENCOUNTER — OFFICE VISIT (OUTPATIENT)
Dept: FAMILY MEDICINE CLINIC | Facility: CLINIC | Age: 72
End: 2017-10-03

## 2017-10-03 VITALS
HEIGHT: 65 IN | WEIGHT: 154.2 LBS | HEART RATE: 88 BPM | OXYGEN SATURATION: 98 % | TEMPERATURE: 98.7 F | DIASTOLIC BLOOD PRESSURE: 66 MMHG | BODY MASS INDEX: 25.69 KG/M2 | SYSTOLIC BLOOD PRESSURE: 110 MMHG

## 2017-10-03 DIAGNOSIS — R10.30 LOWER ABDOMINAL PAIN: Primary | ICD-10-CM

## 2017-10-03 PROCEDURE — 99213 OFFICE O/P EST LOW 20 MIN: CPT | Performed by: FAMILY MEDICINE

## 2017-10-03 RX ORDER — METRONIDAZOLE 250 MG/1
250 TABLET ORAL
Qty: 40 TABLET | Refills: 0 | Status: SHIPPED | OUTPATIENT
Start: 2017-10-03

## 2017-10-03 RX ORDER — CIPROFLOXACIN 500 MG/1
500 TABLET, FILM COATED ORAL 2 TIMES DAILY
Qty: 20 TABLET | Refills: 0 | Status: SHIPPED | OUTPATIENT
Start: 2017-10-03

## 2017-10-03 NOTE — PROGRESS NOTES
"  Chief Complaint   Patient presents with   • constipation     left lower abdomen pain       Subjective.../HPI  Patient present today with pain in left lower abd. Worried about cancer spreading and had blood tests but down to 5.9. Taking taxol.    I have reviewed the patient's medical history in detail and updated the computerized patient record.    Family History   Problem Relation Age of Onset   • Colon cancer Sister        Social History     Social History   • Marital status:      Spouse name: N/A   • Number of children: N/A   • Years of education: N/A     Occupational History   • Not on file.     Social History Main Topics   • Smoking status: Former Smoker   • Smokeless tobacco: Never Used   • Alcohol use No   • Drug use: No   • Sexual activity: Defer     Other Topics Concern   • Not on file     Social History Narrative       Review of Systems:   Review of Systems   Constitutional: Negative.    HENT: Negative.    Eyes: Negative.    Respiratory: Negative.    Cardiovascular: Negative.    Gastrointestinal: Positive for abdominal pain and constipation.   Endocrine: Negative.    Genitourinary: Negative.    Musculoskeletal: Negative.    Skin: Negative.    Allergic/Immunologic: Negative.    Neurological: Negative.    Hematological: Negative.    Psychiatric/Behavioral: Negative.        Objective:  Vital Signs     Vitals:    10/03/17 1533   BP: 110/66   BP Location: Right arm   Patient Position: Sitting   Cuff Size: Adult   Pulse: 88   Temp: 98.7 °F (37.1 °C)   TempSrc: Oral   SpO2: 98%   Weight: 154 lb 3.2 oz (69.9 kg)   Height: 65\" (165.1 cm)     Physical Exam   Constitutional: She appears well-developed and well-nourished.   HENT:   Head: Normocephalic.   Eyes: Pupils are equal, round, and reactive to light.   Neck: Normal range of motion.   Cardiovascular: Normal rate, regular rhythm and normal heart sounds.    Pulmonary/Chest: Effort normal.   Abdominal: Soft. Bowel sounds are normal. She exhibits no " distension and no mass. There is tenderness (tender in LLQ and some in RLQ). There is no rebound and no guarding.        Results Review:      REVIEWED AND DISCUSSED CLINICAL RESULTS WITH PATIENT                          Current Outpatient Prescriptions:   •  apixaban (ELIQUIS) 5 MG tablet tablet, Take 5 mg by mouth Every 12 (Twelve) Hours., Disp: , Rfl:   •  FLUoxetine (PROzac) 20 MG capsule, Take 1 capsule by mouth  daily, Disp: 90 capsule, Rfl: 0  •  lisinopril (PRINIVIL,ZESTRIL) 10 MG tablet, Take 1 tablet by mouth  daily, Disp: 90 tablet, Rfl: 1  •  LORazepam (ATIVAN) 0.5 MG tablet, Take 1 tablet by mouth 2 (Two) Times a Day As Needed for anxiety., Disp: 30 tablet, Rfl: 2  •  polyethylene glycol (MIRALAX) packet, Take 17 g by mouth Daily., Disp: 30 each, Rfl: 5  •  triamterene-hydrochlorothiazide (DYAZIDE) 37.5-25 MG per capsule, Take 1 capsule by mouth  daily, Disp: 90 capsule, Rfl: 1  •  ciprofloxacin (CIPRO) 500 MG tablet, Take 1 tablet by mouth 2 (Two) Times a Day., Disp: 20 tablet, Rfl: 0  •  metroNIDAZOLE (FLAGYL) 250 MG tablet, Take 1 tablet by mouth 4 (Four) Times a Day Before Meals & at Bedtime., Disp: 40 tablet, Rfl: 0    Procedures    Assessment/Plan     Diagnoses and all orders for this visit:    Lower abdominal pain    Other orders  -     ciprofloxacin (CIPRO) 500 MG tablet; Take 1 tablet by mouth 2 (Two) Times a Day.  -     metroNIDAZOLE (FLAGYL) 250 MG tablet; Take 1 tablet by mouth 4 (Four) Times a Day Before Meals & at Bedtime.         Pt wants to try atb first before does ct. Had recent ct done in may 2017 in [place].     Pedro Pablo Aldridge Jr., MD  10/03/17  4:27 PM

## 2017-12-04 RX ORDER — FLUOXETINE HYDROCHLORIDE 20 MG/1
CAPSULE ORAL
Qty: 90 CAPSULE | Refills: 0 | Status: SHIPPED | OUTPATIENT
Start: 2017-12-04
